# Patient Record
Sex: MALE | Race: WHITE | NOT HISPANIC OR LATINO | URBAN - METROPOLITAN AREA
[De-identification: names, ages, dates, MRNs, and addresses within clinical notes are randomized per-mention and may not be internally consistent; named-entity substitution may affect disease eponyms.]

---

## 2017-04-18 ENCOUNTER — IMPORTED ENCOUNTER (OUTPATIENT)
Dept: URBAN - METROPOLITAN AREA CLINIC 1 | Facility: CLINIC | Age: 77
End: 2017-04-18

## 2017-04-18 PROBLEM — H40.1231: Noted: 2017-04-18

## 2017-04-18 PROCEDURE — 92012 INTRM OPH EXAM EST PATIENT: CPT

## 2017-04-18 PROCEDURE — 92083 EXTENDED VISUAL FIELD XM: CPT

## 2017-04-18 NOTE — PATIENT DISCUSSION
1.  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 VF shows no progression OU. IOP stable on current meds. Continue Latanoprost QHS OU. Compliance with meds. 2.  Pseudophakia OU - (Standard OD Toric OS) H/o YAG Cap OU 3. H/o PVD OU 4. 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 H/o BRVO OS- stable. Continue to observe. Return for an appointment in 6 mo 30 OCT with Dr. Aiden Panda.

## 2017-06-09 NOTE — PATIENT DISCUSSION
Extended ophthalmoscopy performed in addition to routine ophthalmoscopy to more carefully evaluate this dx, hence medically necessary.

## 2017-10-24 ENCOUNTER — IMPORTED ENCOUNTER (OUTPATIENT)
Dept: URBAN - METROPOLITAN AREA CLINIC 1 | Facility: CLINIC | Age: 77
End: 2017-10-24

## 2017-10-24 PROBLEM — H40.1231: Noted: 2017-10-24

## 2017-10-24 PROBLEM — H40.1131: Noted: 2017-10-24

## 2017-10-24 PROBLEM — H04.123: Noted: 2017-10-24

## 2017-10-24 PROBLEM — H16.143: Noted: 2017-10-24

## 2017-10-24 PROBLEM — H35.042: Noted: 2017-10-24

## 2017-10-24 PROBLEM — H43.813: Noted: 2017-10-24

## 2017-10-24 PROBLEM — H35.033: Noted: 2017-10-24

## 2017-10-24 PROCEDURE — 92014 COMPRE OPH EXAM EST PT 1/>: CPT

## 2017-10-24 PROCEDURE — 92133 CPTRZD OPH DX IMG PST SGM ON: CPT

## 2017-10-24 NOTE — PATIENT DISCUSSION
1.  Mild Low tension glaucoma OU (0.8 OU)- Stable IOP OI. Essentially normal OCT OU. Patient to continue with Latanoprost OU QHS (RX given.) Condition was discussed with patient and patient understands. Will continue to monitor patient for any progression in condition. Patient was advised to call us with any problems questions or concerns. 2.  JASON w/ PEK OU- The use of artificial tears OU TID were recommended. 3.  PVD OU- Old stable. 4.  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 H/o Old BRVO OS- Observe6. Return for an appointment for a 10 in 6 months with Dr. Kashif Laguna.

## 2018-04-24 ENCOUNTER — IMPORTED ENCOUNTER (OUTPATIENT)
Dept: URBAN - METROPOLITAN AREA CLINIC 1 | Facility: CLINIC | Age: 78
End: 2018-04-24

## 2018-04-24 PROBLEM — H16.143: Noted: 2018-04-24

## 2018-04-24 PROBLEM — H40.1231: Noted: 2018-04-24

## 2018-04-24 PROBLEM — H04.123: Noted: 2018-04-24

## 2018-04-24 PROBLEM — Z96.1: Noted: 2018-04-24

## 2018-04-24 PROCEDURE — 92012 INTRM OPH EXAM EST PATIENT: CPT

## 2018-04-24 NOTE — PATIENT DISCUSSION
1.  Mild Low tension glaucoma OU -(.8 OU) IOP stable -Patient to continue with current gtt regimen. (Latanoprost OU QHS Condition was discussed with patient and patient understands. Will continue to monitor patient for any progression in condition. Patient was advised to call us with any problems questions or concerns. 2.  JASON w/ PEK OU-The continuation of artificial tears were recommended. 3.  Pseudophakia OU - H/o Yag OU Toric OS4. Return for an appointment for 6mo/30 OCT with Dr. Fortunato Grande.

## 2018-10-30 ENCOUNTER — IMPORTED ENCOUNTER (OUTPATIENT)
Dept: URBAN - METROPOLITAN AREA CLINIC 1 | Facility: CLINIC | Age: 78
End: 2018-10-30

## 2018-10-30 PROBLEM — H04.123: Noted: 2018-10-30

## 2018-10-30 PROBLEM — H40.1131: Noted: 2018-10-30

## 2018-10-30 PROBLEM — H16.143: Noted: 2018-10-30

## 2018-10-30 PROCEDURE — 92133 CPTRZD OPH DX IMG PST SGM ON: CPT

## 2018-10-30 PROCEDURE — 92014 COMPRE OPH EXAM EST PT 1/>: CPT

## 2018-10-30 NOTE — PATIENT DISCUSSION
1.  Mild Low tension glaucoma OU (0.8 OU)- IOP slightly increased OU with stated compliance. OCT shows no progression OU. Continue Latanoprost OU QHS. Written RX given. 2. JASON w/ PEK OU- Cont ATs BID OU Routinely. 3.  PVD OU- stable RD Precautions. 4.  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 H/o Old BRVO OS 6. Pseudophakia OU (Standard OD Toric  OS) H/o YAG Cap OUReturn for an appointment in 6 mo 10 (no VF per pmg!) with Dr. Trena Adams.

## 2019-04-30 ENCOUNTER — IMPORTED ENCOUNTER (OUTPATIENT)
Dept: URBAN - METROPOLITAN AREA CLINIC 1 | Facility: CLINIC | Age: 79
End: 2019-04-30

## 2019-04-30 PROBLEM — H40.1231: Noted: 2019-04-30

## 2019-04-30 PROCEDURE — 92083 EXTENDED VISUAL FIELD XM: CPT

## 2019-04-30 PROCEDURE — 92012 INTRM OPH EXAM EST PATIENT: CPT

## 2019-04-30 NOTE — PATIENT DISCUSSION
1.  Mild LTG OU (CD 0.8 OU)- IOP stable OU on Latanoprost. VF shows no progression OU. Continue Latanoprost OU QHS. 2.  JASON w/ PEK OU- Cont ATs BID OU Routinely. 3.  H/o PVD OU 4. 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 H/o Old BRVO OS 6. Pseudophakia OU (Standard OD Toric  OS) H/o YAG Cap OUReturn for an appointment in 6 mo 30 OCT with Dr. Fortunato Grande.

## 2019-11-05 ENCOUNTER — IMPORTED ENCOUNTER (OUTPATIENT)
Dept: URBAN - METROPOLITAN AREA CLINIC 1 | Facility: CLINIC | Age: 79
End: 2019-11-05

## 2019-11-05 PROBLEM — H01.001: Noted: 2019-11-05

## 2019-11-05 PROBLEM — Z96.1: Noted: 2019-11-05

## 2019-11-05 PROBLEM — H01.004: Noted: 2019-11-05

## 2019-11-05 PROBLEM — H35.033: Noted: 2019-11-05

## 2019-11-05 PROBLEM — H40.1231: Noted: 2019-11-05

## 2019-11-05 PROBLEM — H35.042: Noted: 2019-11-05

## 2019-11-05 PROBLEM — H04.123: Noted: 2019-11-05

## 2019-11-05 PROBLEM — H16.143: Noted: 2019-11-05

## 2019-11-05 PROBLEM — H43.813: Noted: 2019-11-05

## 2019-11-05 PROCEDURE — 92014 COMPRE OPH EXAM EST PT 1/>: CPT

## 2019-11-05 PROCEDURE — 92133 CPTRZD OPH DX IMG PST SGM ON: CPT

## 2019-11-05 NOTE — PATIENT DISCUSSION
1.  Mild Low Tension Glaucoma OU (CD 0.80 OU) - No progression by OCT. IOP stable cont Latanoprost QHS OU. Condition was discussed with patient and patient understands. Will continue to monitor patient for any progression in condition. Patient was advised to call us with any problems questions or concerns. 2.  JASON w/ PEK OU- Recommend ATs TID OU routinely 3. Pseudophakia OU - (Standard OD Toric  OS) H/o YAG Cap OU4. Anterior Blepharitis OU - Daily Hot compresses and lid scrubs were recommended. 5. 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 PVD OU - RD precautions. 7.  H/o Old BRVO OS Patient defers the refraction at today's visitReturn for an appointment in 6 months 10 (no testing per PMG) with Dr. Ammon Mccain.

## 2020-05-05 ENCOUNTER — IMPORTED ENCOUNTER (OUTPATIENT)
Dept: URBAN - METROPOLITAN AREA CLINIC 1 | Facility: CLINIC | Age: 80
End: 2020-05-05

## 2020-05-05 PROBLEM — H40.1231: Noted: 2020-05-05

## 2020-05-05 PROCEDURE — 99213 OFFICE O/P EST LOW 20 MIN: CPT

## 2020-05-05 NOTE — PATIENT DISCUSSION
1.  Mild Low Tension Glaucoma OU (CD 0.80 OU) - IOP stable cont Latanoprost QHS OU (written rx given). Condition was discussed with patient and patient understands. Will continue to monitor patient for any progression in condition. Patient was advised to call us with any problems questions or concerns. 2.  JASON w/ PEK OU- Recommend ATs TID OU routinely 3. Pseudophakia OU - (Standard OD Toric  OS) H/o YAG Cap OU4. Anterior Blepharitis OU - Daily Hot compresses and lid scrubs were recommended. 5. 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 H/o PVD OU7. H/o Old BRVO OSReturn for an appointment in 6 months 30/OCT with Dr. Lakeisha Rowe.

## 2020-05-05 NOTE — PATIENT DISCUSSION
AJSON w/ PEK OU- Recommend ATs TID OU routinely 3. Pseudophakia OU - (Standard OD Toric  OS) H/o YAG Cap OU4. Anterior Blepharitis OU - Daily Hot compresses and lid scrubs were recommended. 5. 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 H/o PVD OU7. H/o Old BRVO OSReturn for an appointment in 6 months 30/OCT with Dr. Tamera Easton.

## 2020-11-10 ENCOUNTER — IMPORTED ENCOUNTER (OUTPATIENT)
Dept: URBAN - METROPOLITAN AREA CLINIC 1 | Facility: CLINIC | Age: 80
End: 2020-11-10

## 2020-11-10 PROBLEM — H40.1231: Noted: 2020-11-10

## 2020-11-10 PROBLEM — H35.033: Noted: 2020-11-10

## 2020-11-10 PROBLEM — H01.001: Noted: 2020-11-10

## 2020-11-10 PROBLEM — H01.004: Noted: 2020-11-10

## 2020-11-10 PROBLEM — H16.143: Noted: 2020-11-10

## 2020-11-10 PROBLEM — H04.123: Noted: 2020-11-10

## 2020-11-10 PROBLEM — Z96.1: Noted: 2020-11-10

## 2020-11-10 PROBLEM — H35.042: Noted: 2020-11-10

## 2020-11-10 PROCEDURE — 92014 COMPRE OPH EXAM EST PT 1/>: CPT

## 2020-11-10 PROCEDURE — 92133 CPTRZD OPH DX IMG PST SGM ON: CPT

## 2020-11-10 NOTE — PATIENT DISCUSSION
1.  Mild Low Tension Glaucoma OU (CD 0.80 OU) - No progression by OCT. IOP stable continue Latanoprost QHS OU. Condition was discussed with patient and patient understands. Will continue to monitor patient for any progression in condition. Patient was advised to call us with any problems questions or concerns. 2.  JASON w/ PEK OU- Recommend ATs TID OU routinely 3. Pseudophakia OU - (Standard OD Toric  OS) H/o YAG Cap OU4. Anterior Blepharitis OU - Daily Hot compresses and lid scrubs were recommended. 5. 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 PVD OU - RD precautions7. H/o Old BRVO OSReturn for an appointment in 6 months 10/HVF with Dr. Nova Paulson.

## 2021-05-11 ENCOUNTER — IMPORTED ENCOUNTER (OUTPATIENT)
Dept: URBAN - METROPOLITAN AREA CLINIC 1 | Facility: CLINIC | Age: 81
End: 2021-05-11

## 2021-05-11 PROBLEM — H40.1231: Noted: 2021-05-11

## 2021-05-11 PROCEDURE — 99213 OFFICE O/P EST LOW 20 MIN: CPT

## 2021-05-11 NOTE — PATIENT DISCUSSION
(CD 0.80 OU) - IOP stable continue Latanoprost QHS OU. Condition was discussed with patient and patient understands. Will continue to monitor patient for any progression in condition. Patient was advised to call us with any problems questions or concerns. 2.  JASON w/ PEK OU- Recommend ATs TID OU routinely 3. Pseudophakia OU - (Standard OD Toric  OS) H/o YAG Cap OU4. Anterior Blepharitis OU - Daily Hot compresses and lid scrubs were recommended. 5. 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 H/o PVD OU  7. H/o Old BRVO OSReturn for an appointment in 6 months 30/OCT with Dr. Ricky Poe.

## 2021-05-11 NOTE — PATIENT DISCUSSION
1.  Mild Low Tension Glaucoma OU (CD 0.80 OU) - IOP stable continue Latanoprost QHS OU. Condition was discussed with patient and patient understands. Will continue to monitor patient for any progression in condition. Patient was advised to call us with any problems questions or concerns. 2.  JASON w/ PEK OU- Recommend ATs TID OU routinely 3. Pseudophakia OU - (Standard OD Toric  OS) H/o YAG Cap OU4. Anterior Blepharitis OU - Daily Hot compresses and lid scrubs were recommended. 5. 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 H/o PVD OU  7. H/o Old BRVO OSReturn for an appointment in 6 months 30/OCT with Dr. Ingrid Tillman.

## 2021-11-23 ENCOUNTER — IMPORTED ENCOUNTER (OUTPATIENT)
Dept: URBAN - METROPOLITAN AREA CLINIC 1 | Facility: CLINIC | Age: 81
End: 2021-11-23

## 2021-11-23 PROBLEM — H40.1231: Noted: 2021-11-23

## 2021-11-23 PROCEDURE — 92133 CPTRZD OPH DX IMG PST SGM ON: CPT

## 2021-11-23 PROCEDURE — 99214 OFFICE O/P EST MOD 30 MIN: CPT

## 2021-11-23 NOTE — PATIENT DISCUSSION
1.  Mild Low Tension Glaucoma OU (CD 0.80 OU) -- IOP stable continue Latanoprost QHS OU. No progression by OCT OU today. Condition was discussed with patient and patient understands. Will continue to monitor patient for any progression in condition. Patient was advised to call us with any problems questions or concerns. 2.  JASON w/ PEK OU -- Continue ATs TID OU routinely 3. Pseudophakia OU -- (Standard OD Toric  OS) H/o YAG Cap OU4. Anterior Blepharitis OU -- Daily Hot compresses and lid scrubs were recommended. 5. 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 Cont HTN control. 6.  PVD w/o OU -- RD precautions. 7.  H/o Old BRVO OS8. S/p PI OS. Return for an appointment in 6 months 10/HVF 24-2 with Dr. Nils Blakely.

## 2022-04-02 ASSESSMENT — VISUAL ACUITY
OS_CC: J1
OS_CC: 20/20
OD_CC: 20/25
OS_CC: 20/25
OD_CC: J1
OD_CC: 20/20
OD_CC: 20/20
OS_CC: 20/20
OS_CC: 20/20
OD_CC: 20/25
OD_CC: J1
OD_CC: 20/20
OS_CC: 20/20
OD_CC: 20/20
OS_CC: 20/20
OD_CC: 20/20-1
OS_CC: J1
OD_CC: 20/20
OS_CC: 20/20
OS_CC: 20/25
OD_CC: 20/20-1
OS_CC: 20/25
OD_CC: 20/20
OS_CC: 20/20

## 2022-04-02 ASSESSMENT — TONOMETRY
OS_IOP_MMHG: 10
OD_IOP_MMHG: 13
OD_IOP_MMHG: 9
OD_IOP_MMHG: 12
OD_IOP_MMHG: 10
OD_IOP_MMHG: 10
OS_IOP_MMHG: 10
OS_IOP_MMHG: 12
OS_IOP_MMHG: 10
OD_IOP_MMHG: 10
OS_IOP_MMHG: 10
OS_IOP_MMHG: 10
OD_IOP_MMHG: 11
OS_IOP_MMHG: 11
OD_IOP_MMHG: 10
OS_IOP_MMHG: 9
OS_IOP_MMHG: 10
OS_IOP_MMHG: 13
OD_IOP_MMHG: 11
OD_IOP_MMHG: 10

## 2022-06-14 ENCOUNTER — FOLLOW UP (OUTPATIENT)
Dept: URBAN - METROPOLITAN AREA CLINIC 1 | Facility: CLINIC | Age: 82
End: 2022-06-14

## 2022-06-14 DIAGNOSIS — H04.123: ICD-10-CM

## 2022-06-14 DIAGNOSIS — H01.024: ICD-10-CM

## 2022-06-14 DIAGNOSIS — Z96.1: ICD-10-CM

## 2022-06-14 DIAGNOSIS — H16.143: ICD-10-CM

## 2022-06-14 DIAGNOSIS — Z98.890: ICD-10-CM

## 2022-06-14 DIAGNOSIS — H01.021: ICD-10-CM

## 2022-06-14 DIAGNOSIS — H40.1231: ICD-10-CM

## 2022-06-14 PROCEDURE — 92083 EXTENDED VISUAL FIELD XM: CPT

## 2022-06-14 PROCEDURE — 92012 INTRM OPH EXAM EST PATIENT: CPT

## 2022-06-14 ASSESSMENT — TONOMETRY
OS_IOP_MMHG: 11
OD_IOP_MMHG: 13

## 2022-06-14 ASSESSMENT — VISUAL ACUITY
OS_SC: 20/25
OD_SC: 20/20

## 2022-06-14 NOTE — PATIENT DISCUSSION
(CD 0.80 OU) -- IOP stable continue Latanoprost QHS OU. HVF today WNL with probable peripheral artifact. Condition was discussed with patient and patient understands. Will continue to monitor patient for any progression in condition. Patient was advised to call us with any problems questions or concerns.

## 2023-01-10 ENCOUNTER — COMPREHENSIVE EXAM (OUTPATIENT)
Dept: URBAN - METROPOLITAN AREA CLINIC 1 | Facility: CLINIC | Age: 83
End: 2023-01-10

## 2023-01-10 DIAGNOSIS — H43.813: ICD-10-CM

## 2023-01-10 DIAGNOSIS — H35.033: ICD-10-CM

## 2023-01-10 DIAGNOSIS — H40.1231: ICD-10-CM

## 2023-01-10 DIAGNOSIS — H04.123: ICD-10-CM

## 2023-01-10 DIAGNOSIS — H01.024: ICD-10-CM

## 2023-01-10 DIAGNOSIS — Z96.1: ICD-10-CM

## 2023-01-10 DIAGNOSIS — H16.143: ICD-10-CM

## 2023-01-10 DIAGNOSIS — H01.021: ICD-10-CM

## 2023-01-10 PROCEDURE — 92133 CPTRZD OPH DX IMG PST SGM ON: CPT

## 2023-01-10 PROCEDURE — 99214 OFFICE O/P EST MOD 30 MIN: CPT

## 2023-01-10 ASSESSMENT — TONOMETRY
OD_IOP_MMHG: 10
OS_IOP_MMHG: 10

## 2023-01-10 ASSESSMENT — VISUAL ACUITY
OS_SC: 20/25
OD_SC: 20/20-1

## 2023-01-10 NOTE — PATIENT DISCUSSION
(CD 0.80 OU) No progression by OCT OU. IOP stable continue Latanoprost QHS OU. Condition was discussed with patient and patient understands. Will continue to monitor patient for any progression in condition. Patient was advised to call us with any problems questions or concerns.

## 2023-07-11 ENCOUNTER — FOLLOW UP (OUTPATIENT)
Dept: URBAN - METROPOLITAN AREA CLINIC 1 | Facility: CLINIC | Age: 83
End: 2023-07-11

## 2023-07-11 DIAGNOSIS — H40.1231: ICD-10-CM

## 2023-07-11 DIAGNOSIS — H35.033: ICD-10-CM

## 2023-07-11 PROCEDURE — 99213 OFFICE O/P EST LOW 20 MIN: CPT

## 2023-07-11 PROCEDURE — 92083 EXTENDED VISUAL FIELD XM: CPT

## 2023-07-11 ASSESSMENT — VISUAL ACUITY
OD_SC: 20/20
OU_SC: J2
OS_SC: 20/20-1

## 2023-07-11 ASSESSMENT — TONOMETRY
OS_IOP_MMHG: 9
OD_IOP_MMHG: 10

## 2024-03-25 ENCOUNTER — COMPREHENSIVE EXAM (OUTPATIENT)
Dept: URBAN - METROPOLITAN AREA CLINIC 1 | Facility: CLINIC | Age: 84
End: 2024-03-25

## 2024-03-25 DIAGNOSIS — H40.1231: ICD-10-CM

## 2024-03-25 DIAGNOSIS — H01.021: ICD-10-CM

## 2024-03-25 DIAGNOSIS — H16.143: ICD-10-CM

## 2024-03-25 DIAGNOSIS — H35.033: ICD-10-CM

## 2024-03-25 DIAGNOSIS — H04.123: ICD-10-CM

## 2024-03-25 DIAGNOSIS — H01.024: ICD-10-CM

## 2024-03-25 PROCEDURE — 99214 OFFICE O/P EST MOD 30 MIN: CPT

## 2024-03-25 PROCEDURE — 92133 CPTRZD OPH DX IMG PST SGM ON: CPT

## 2024-03-25 ASSESSMENT — VISUAL ACUITY
OD_SC: 20/20-1
OS_SC: 20/20

## 2024-03-25 ASSESSMENT — TONOMETRY
OD_IOP_MMHG: 10
OS_IOP_MMHG: 10

## 2024-10-04 ENCOUNTER — FOLLOW UP (OUTPATIENT)
Dept: URBAN - METROPOLITAN AREA CLINIC 1 | Facility: CLINIC | Age: 84
End: 2024-10-04

## 2024-10-04 DIAGNOSIS — H40.1231: ICD-10-CM

## 2024-10-04 PROCEDURE — 99213 OFFICE O/P EST LOW 20 MIN: CPT

## 2024-10-04 PROCEDURE — 92083 EXTENDED VISUAL FIELD XM: CPT

## 2025-04-04 ENCOUNTER — COMPREHENSIVE EXAM (OUTPATIENT)
Age: 85
End: 2025-04-04

## 2025-04-04 DIAGNOSIS — H35.033: ICD-10-CM

## 2025-04-04 DIAGNOSIS — H16.143: ICD-10-CM

## 2025-04-04 DIAGNOSIS — H40.1231: ICD-10-CM

## 2025-04-04 DIAGNOSIS — Z96.1: ICD-10-CM

## 2025-04-04 DIAGNOSIS — H04.123: ICD-10-CM

## 2025-04-04 PROCEDURE — 92133 CPTRZD OPH DX IMG PST SGM ON: CPT

## 2025-04-04 PROCEDURE — 99214 OFFICE O/P EST MOD 30 MIN: CPT

## 2025-04-04 PROCEDURE — 92015 DETERMINE REFRACTIVE STATE: CPT
